# Patient Record
Sex: MALE | Race: OTHER | HISPANIC OR LATINO | Employment: FULL TIME | URBAN - METROPOLITAN AREA
[De-identification: names, ages, dates, MRNs, and addresses within clinical notes are randomized per-mention and may not be internally consistent; named-entity substitution may affect disease eponyms.]

---

## 2017-09-07 ENCOUNTER — HOSPITAL ENCOUNTER (EMERGENCY)
Facility: HOSPITAL | Age: 22
Discharge: HOME | End: 2017-09-07
Admitting: EMERGENCY MEDICINE
Payer: OTHER MISCELLANEOUS

## 2017-09-07 VITALS
HEIGHT: 68 IN | HEART RATE: 74 BPM | OXYGEN SATURATION: 100 % | TEMPERATURE: 97 F | SYSTOLIC BLOOD PRESSURE: 129 MMHG | DIASTOLIC BLOOD PRESSURE: 81 MMHG | RESPIRATION RATE: 16 BRPM | WEIGHT: 135 LBS | BODY MASS INDEX: 20.46 KG/M2

## 2017-09-07 DIAGNOSIS — M67.431 GANGLION CYST OF DORSUM OF RIGHT WRIST: Primary | ICD-10-CM

## 2017-09-07 PROCEDURE — 99283 EMERGENCY DEPT VISIT LOW MDM: CPT

## 2018-01-15 NOTE — PROGRESS NOTES
Patient Health Assessment    Date:            05/12/2016  Blood Pressure:  115/65  Pulse:           68  Age:             20  Weight:          139 lbs  Height/Length:   5' 7"  Body Mass Index: 21 7  Provider:        [id number]  Clinic:          [place] [id number]  Medical Alert:  Medications: Allergies:  Since Last Visit: Medical Alert: No Change    Medications: No Change    Allergies:        No Change  Pain Scale Type: Numeric Pain ScalePain Level: 0  Description:     Adult Prophy,fl varnish- no dentist today on Jessica Curet MH:  CC:none  IOE:light plaque, little to no calc  ,light bleeding upon flossing only-OHI &  supplies given  Scaled, polished and flossed  NV=after 10/2/16 for exam-he will be graduating     ----- Signed on Thursday, May 12, 2016 at 1:10:52 PM  -----  ----- Provider: [id number] - Jovani Sutherland,  -- Clinic: [facility] -----

## 2019-11-28 ENCOUNTER — HOSPITAL ENCOUNTER (EMERGENCY)
Facility: HOSPITAL | Age: 24
Discharge: HOME | End: 2019-11-28
Attending: EMERGENCY MEDICINE | Admitting: EMERGENCY MEDICINE
Payer: OTHER MISCELLANEOUS

## 2019-11-28 ENCOUNTER — APPOINTMENT (EMERGENCY)
Dept: RADIOLOGY | Facility: HOSPITAL | Age: 24
End: 2019-11-28
Payer: OTHER MISCELLANEOUS

## 2019-11-28 VITALS
RESPIRATION RATE: 18 BRPM | BODY MASS INDEX: 18.77 KG/M2 | OXYGEN SATURATION: 100 % | SYSTOLIC BLOOD PRESSURE: 147 MMHG | WEIGHT: 123.46 LBS | DIASTOLIC BLOOD PRESSURE: 80 MMHG | HEART RATE: 75 BPM

## 2019-11-28 DIAGNOSIS — M54.6 ACUTE RIGHT-SIDED THORACIC BACK PAIN: ICD-10-CM

## 2019-11-28 DIAGNOSIS — F43.10 PTSD (POST-TRAUMATIC STRESS DISORDER): ICD-10-CM

## 2019-11-28 DIAGNOSIS — S06.0X9A CONCUSSION: ICD-10-CM

## 2019-11-28 DIAGNOSIS — S09.90XA INJURY OF HEAD, INITIAL ENCOUNTER: ICD-10-CM
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
## 2019-11-28 PROCEDURE — 99284 EMERGENCY DEPT VISIT MOD MDM: CPT

## 2019-11-28 PROCEDURE — 90715 TDAP VACCINE 7 YRS/> IM: CPT | Performed by: EMERGENCY MEDICINE

## 2019-11-28 PROCEDURE — 99283 EMERGENCY DEPT VISIT LOW MDM: CPT | Performed by: EMERGENCY MEDICINE

## 2019-11-28 PROCEDURE — 90471 IMMUNIZATION ADMIN: CPT

## 2019-11-28 PROCEDURE — 71046 X-RAY EXAM CHEST 2 VIEWS: CPT

## 2019-11-28 RX ORDER — IBUPROFEN 600 MG/1
600 TABLET ORAL EVERY 6 HOURS PRN
Qty: 30 TABLET | Refills: 0 | Status: SHIPPED | OUTPATIENT
Start: 2019-11-28

## 2019-11-28 RX ORDER — IBUPROFEN 600 MG/1
600 TABLET ORAL ONCE
Status: COMPLETED | OUTPATIENT
Start: 2019-11-28 | End: 2019-11-28

## 2019-11-28 RX ADMIN — IBUPROFEN 600 MG: 600 TABLET, FILM COATED ORAL at 12:43

## 2019-11-28 RX ADMIN — TETANUS TOXOID, REDUCED DIPHTHERIA TOXOID AND ACELLULAR PERTUSSIS VACCINE, ADSORBED 0.5 ML: 5; 2.5; 8; 8; 2.5 SUSPENSION INTRAMUSCULAR at 12:43
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
## 2019-12-03 ENCOUNTER — OFFICE VISIT (OUTPATIENT)
Dept: INTERNAL MEDICINE CLINIC | Facility: CLINIC | Age: 24
End: 2019-12-03

## 2019-12-03 VITALS
BODY MASS INDEX: 17.96 KG/M2 | WEIGHT: 121.25 LBS | DIASTOLIC BLOOD PRESSURE: 90 MMHG | SYSTOLIC BLOOD PRESSURE: 104 MMHG | HEART RATE: 62 BPM | TEMPERATURE: 97.8 F | HEIGHT: 69 IN

## 2019-12-03 DIAGNOSIS — F43.23 SITUATIONAL MIXED ANXIETY AND DEPRESSIVE DISORDER: Primary | ICD-10-CM

## 2019-12-03 DIAGNOSIS — F43.10 PTSD (POST-TRAUMATIC STRESS DISORDER): ICD-10-CM
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
## 2019-12-03 PROCEDURE — 99203 OFFICE O/P NEW LOW 30 MIN: CPT | Performed by: PHYSICIAN ASSISTANT
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
## 2019-12-03 NOTE — LETTER
To Whom It May Concern:    Allie Miner is a patient at our practice and was evaluated by me on 12/3/19  Please excuse him for missing work and will be able to return thursday 12/5/19  Thanks you for your attention with this matter, and we will continue to follow wit him closely        Sincerely,              Ryann Hansen PA-C

## 2024-07-04 ENCOUNTER — HOSPITAL ENCOUNTER (EMERGENCY)
Facility: HOSPITAL | Age: 29
Discharge: HOME | End: 2024-07-04
Attending: EMERGENCY MEDICINE

## 2024-07-04 ENCOUNTER — APPOINTMENT (EMERGENCY)
Dept: RADIOLOGY | Facility: HOSPITAL | Age: 29
End: 2024-07-04

## 2024-07-04 VITALS
RESPIRATION RATE: 18 BRPM | TEMPERATURE: 97.6 F | OXYGEN SATURATION: 100 % | HEART RATE: 63 BPM | SYSTOLIC BLOOD PRESSURE: 130 MMHG | DIASTOLIC BLOOD PRESSURE: 88 MMHG

## 2024-07-04 DIAGNOSIS — M54.9 BACK PAIN: Primary | ICD-10-CM

## 2024-07-04 LAB
ATRIAL RATE: 61 BPM
P AXIS: 74 DEGREES
PR INTERVAL: 128 MS
QRS AXIS: 49 DEGREES
QRSD INTERVAL: 90 MS
QT INTERVAL: 372 MS
QTC INTERVAL: 374 MS
T WAVE AXIS: 58 DEGREES
VENTRICULAR RATE: 61 BPM

## 2024-07-04 PROCEDURE — 71046 X-RAY EXAM CHEST 2 VIEWS: CPT

## 2024-07-04 PROCEDURE — 93005 ELECTROCARDIOGRAM TRACING: CPT

## 2024-07-04 PROCEDURE — 96372 THER/PROPH/DIAG INJ SC/IM: CPT

## 2024-07-04 PROCEDURE — 99285 EMERGENCY DEPT VISIT HI MDM: CPT

## 2024-07-04 PROCEDURE — 99284 EMERGENCY DEPT VISIT MOD MDM: CPT | Performed by: EMERGENCY MEDICINE

## 2024-07-04 PROCEDURE — 93010 ELECTROCARDIOGRAM REPORT: CPT | Performed by: INTERNAL MEDICINE

## 2024-07-04 RX ORDER — KETOROLAC TROMETHAMINE 30 MG/ML
15 INJECTION, SOLUTION INTRAMUSCULAR; INTRAVENOUS ONCE
Status: COMPLETED | OUTPATIENT
Start: 2024-07-04 | End: 2024-07-04

## 2024-07-04 RX ORDER — ACETAMINOPHEN 325 MG/1
650 TABLET ORAL ONCE
Status: COMPLETED | OUTPATIENT
Start: 2024-07-04 | End: 2024-07-04

## 2024-07-04 RX ADMIN — ACETAMINOPHEN 650 MG: 325 TABLET, FILM COATED ORAL at 11:59

## 2024-07-04 RX ADMIN — KETOROLAC TROMETHAMINE 15 MG: 30 INJECTION, SOLUTION INTRAMUSCULAR at 11:59

## 2024-07-04 NOTE — ED PROVIDER NOTES
History  Chief Complaint   Patient presents with    Back Pain     Pt c/o back pain after lifting approximately 3 months ago, c/o worsening pain today that now radiates to chest      Patient is a 28-year-old male without significant past medical history presenting for back pain rating into his chest.  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  His pain has been generally improving over the last couple months but occasionally worsens for period of time.  He is coming in today specifically because now he feels like his back pain is radiating into his chest and that this has worried him.  Patient denies any other associated symptoms such as shortness of breath, nausea, paresthesias, or any other complaint on review of systems.        Prior to Admission Medications   Prescriptions Last Dose Informant Patient Reported? Taking?   ibuprofen (MOTRIN) 600 mg tablet   No No   Sig: Take 1 tablet (600 mg total) by mouth every 6 (six) hours as needed for mild pain      Facility-Administered Medications: None       Past Medical History:   Diagnosis Date    Anxiety     Depression        History reviewed. No pertinent surgical history.    History reviewed. No pertinent family history.  I have reviewed and agree with the history as documented.    E-Cigarette/Vaping     E-Cigarette/Vaping Substances     Social History     Tobacco Use    Smoking status: Never    Smokeless tobacco: Never   Substance Use Topics    Alcohol use: No    Drug use: No        Review of Systems   All other systems reviewed and are negative.      Physical Exam  ED Triage Vitals [07/04/24 1120]   Temperature Pulse Respirations Blood Pressure SpO2   97.6 °F (36.4 °C) 63 18 130/88 100 %      Temp Source Heart Rate Source Patient Position - Orthostatic VS BP Location FiO2 (%)   Tympanic Monitor  Sitting Left arm --      Pain Score       7             Orthostatic Vital Signs  Vitals:    07/04/24 1120   BP: 130/88   Pulse: 63   Patient Position - Orthostatic VS: Sitting       Physical Exam  Vitals and nursing note reviewed.   Constitutional:       General: He is not in acute distress.     Appearance: He is well-developed.   HENT:      Head: Normocephalic and atraumatic.   Eyes:      Conjunctiva/sclera: Conjunctivae normal.   Cardiovascular:      Rate and Rhythm: Normal rate and regular rhythm.      Heart sounds: No murmur heard.  Pulmonary:      Effort: Pulmonary effort is normal. No respiratory distress.      Breath sounds: Normal breath sounds.   Abdominal:      Palpations: Abdomen is soft.      Tenderness: There is no abdominal tenderness.   Musculoskeletal:         General: No swelling.      Cervical back: Neck supple.   Skin:     General: Skin is warm and dry.      Capillary Refill: Capillary refill takes less than 2 seconds.   Neurological:      Mental Status: He is alert.   Psychiatric:         Mood and Affect: Mood normal.         ED Medications  Medications   ketorolac (TORADOL) injection 15 mg (15 mg Intramuscular Given 7/4/24 1159)   acetaminophen (TYLENOL) tablet 650 mg (650 mg Oral Given 7/4/24 1159)       Diagnostic Studies  Results Reviewed       None                   XR chest 2 views   Final Result by Tere Ramos MD (07/04 1415)      No acute cardiopulmonary disease.               Workstation performed: [id number]               Procedures  Procedures      ED Course  ED Course as of 07/04/24 1708   Thu Jul 04, 2024   1135 Procedure Note: EKG  Date/Time: 07/04/24 11:35 AM   Interpreted by: Mauro Wong  Indications / Diagnosis: CP  ECG reviewed by me, the ED Provider: yes   The EKG demonstrates: normal EKG, normal sinus rhythm, there are no previous tracings available for comparison  Rhythm: normal sinus  Intervals: normal intervals  Axis: normal axis  QRS/Blocks: normal QRS  ST  Changes: No acute ST Changes, no STD/STEVEN.                       PERC Rule for PE      Flowsheet Row Most Recent Value   PERC Rule for PE    Age >=50 0 Filed at: 07/04/2024 1136   HR >=100 0 Filed at: 07/04/2024 1136   O2 Sat on room air < 95% 0 Filed at: 07/04/2024 1136   History of PE or DVT 0 Filed at: 07/04/2024 1136   Recent trauma or surgery 0 Filed at: 07/04/2024 1136   Hemoptysis 0 Filed at: 07/04/2024 1136   Exogenous estrogen 0 Filed at: 07/04/2024 1136   Unilateral leg swelling 0 Filed at: 07/04/2024 1136   PERC Rule for PE Results 0 Filed at: 07/04/2024 1136                    Wells' Criteria for PE      Flowsheet Row Most Recent Value   Wells' Criteria for PE    Clinical signs and symptoms of DVT 0 Filed at: 07/04/2024 1136   PE is primary diagnosis or equally likely 0 Filed at: 07/04/2024 1136   HR >100 0 Filed at: 07/04/2024 1136   Immobilization at least 3 days or Surgery in the previous 4 weeks 0 Filed at: 07/04/2024 1136   Previous, objectively diagnosed PE or DVT 0 Filed at: 07/04/2024 1136   Hemoptysis 0 Filed at: 07/04/2024 1136   Malignancy with treatment within 6 months or palliative 0 Filed at: 07/04/2024 1136   Wells' Criteria Total 0 Filed at: 07/04/2024 1136              Medical Decision Making  Patient presents for back pain rating into his chest.  Describes the pain is worse with range of motion of his bilateral arms but not movement or activity per se.  Given history of musculoskeletal back pain, and feeling of radiation, likely musculoskeletal costal muscle pain.  EKG unremarkable, vital signs reassuring.  Doubt ACS, pericarditis, patient percs out for PE.  Will obtain chest x-ray to rule out pneumothorax, fracture.  Symptomatic management.  Will discharge patient with return precautions and follow-up with comprehensive spine results unremarkable    Amount and/or Complexity of Data Reviewed  Radiology: ordered.    Risk  OTC drugs.  Prescription drug  management.          Disposition  Final diagnoses:   Back pain     Time reflects when diagnosis was documented in both MDM as applicable and the Disposition within this note       Time User Action Codes Description Comment    7/4/2024 12:36 PM Mauro Wong Add [M54.9] Back pain           ED Disposition       ED Disposition   Discharge    Condition   Stable    Date/Time   Thu Jul 4, 2024 1234    Comment   Alvarez Pedroza discharge to home/self care.                   Follow-up Information       Follow up With Specialties Details Why Contact Info Additional Information    [facility] Physical Therapy Call   [phone]            Discharge Medication List as of 7/4/2024 12:37 PM        CONTINUE these medications which have NOT CHANGED    Details   ibuprofen (MOTRIN) 600 mg tablet Take 1 tablet (600 mg total) by mouth every 6 (six) hours as needed for mild pain, Starting Thu 11/28/2019, Print               PDMP Review       None             ED Provider  Attending physically available and evaluated Alvarez Pedroza. I managed the patient along with the ED Attending.    Electronically Signed by           Mauro Wong MD  07/04/24 8636

## 2024-07-04 NOTE — DISCHARGE INSTRUCTIONS
Follow-up with [facility] for further treatment of your back pain    Follow-up with the resources provided to you for outpatient depression treatment

## 2024-07-04 NOTE — ED ATTENDING ATTESTATION
7/4/2024   IGail MD, saw and evaluated the patient. I have discussed the patient with the resident/non-physician practitioner and agree with the resident's/non-physician practitioner's findings, Plan of Care, and MDM as documented in the resident's/non-physician practitioner's note, except where noted. All available labs and Radiology studies were reviewed.  I was present for key portions of any procedure(s) performed by the resident/non-physician practitioner and I was immediately available to provide assistance.       At this point I agree with the current assessment done in the Emergency Department.  I have conducted an independent evaluation of this patient a history and physical is as follows:    Unit/Bed#: [facility] Encounter: [id number]    Chief Complaint   Patient presents with    Back Pain     Pt c/o back pain after lifting approximately 3 months ago, c/o worsening pain today that now radiates to chest      28-year-old male with no significant past medical history presenting with back pain.  Patient reports pulling his back when he was using a rowing machine in the gym about 3 months ago.  Since then, he intermittently has worsening pain.  Since the injury, he is switched jobs and is no longer lifting heavy objects, however, he notes that his upper back is still intermittently flaring up and causing significant pain.  Pain is worsened with movement.  Patient notes being under significant amount of stress due to his job as well as due to having a new baby who is only several months old.  He reports occasionally feeling down and irritable and having episodes of anxiety and panic.  One of his friends who has similar issues encouraged him to seek help.  Patient has not had any SI or HI but he would be interested in following up with a specialist.    Physical Exam  ED Triage Vitals [07/04/24 1120]   Temperature Pulse Respirations Blood Pressure SpO2   97.6 °F (36.4 °C) 63 18 130/88 100 %      Temp Source  Heart Rate Source Patient Position - Orthostatic VS BP Location FiO2 (%)   Tympanic Monitor Sitting Left arm --      Pain Score       7           Vital signs and nursing notes reviewed    CONSTITUTIONAL: male appearing stated age resting in bed, in no acute distress  HEENT: atraumatic, normocephalic. Sclera anicteric, conjunctiva are not injected. Moist oral mucosa  CARDIOVASCULAR/CHEST: RRR, no M/R/G. 2+ radial pulses  PULMONARY: Breathing comfortably on RA. Breath sounds are equal and clear to auscultation  ABDOMEN: non-distended. BS present, normoactive. Non-tender  MSK: No midline C, T, or L-spine tenderness.  There is significant tenderness with palpation over bilateral cervical paraspinal muscle group and all the way down to patient's rhomboids.  There is hypertonicity of the cervical and upper thoracic musculature bilaterally.  Patient moves all extremities, no deformities, no peripheral edema, no calf asymmetry  NEURO: Awake, alert, and oriented x 3. Face symmetric. Moves all extremities spontaneously. No focal neurologic deficits  SKIN: Warm, appears well-perfused  MENTAL STATUS: Normal affect      Labs and Imaging  Labs Reviewed - No data to display    XR chest 2 views   Final Result      No acute cardiopulmonary disease.               Workstation performed: [id number]               Procedures  Procedures        ED Course  Medications   ketorolac (TORADOL) injection 15 mg (15 mg Intramuscular Given 7/4/24 1159)   acetaminophen (TYLENOL) tablet 650 mg (650 mg Oral Given 7/4/24 1159)     28-year-old male presenting with neck and upper back muscle pain as well as stress versus adjustment disorder versus depression/anxiety.  Patient treated symptomatically for his muscle strain/spasms.  We did obtain a chest x-ray since some of patient symptoms were worsened with taking a deep breath in.  Chest x-ray to my interpretation is without infiltrates to suggest pneumonia and without evidence of pneumothorax.  Will  plan for patient to follow-up with mental health specialist.  Will also have him follow-up with comprehensive spine program.  Patient discharged to home with recommendations for symptom control, return precautions, and plan for follow up.

## 2024-07-05 ENCOUNTER — TELEPHONE (OUTPATIENT)
Dept: PHYSICAL THERAPY | Facility: OTHER | Age: 29
End: 2024-07-05

## 2024-07-05 NOTE — TELEPHONE ENCOUNTER
Call placed to the patient per [facility] referral.    V/m left in Indonesian for patient to call back. Phone number and hours of business provided.    This is the 1st attempt to reach the patient. Will defer referral per protocol.    H . I ??

## 2024-07-29 ENCOUNTER — TELEPHONE (OUTPATIENT)
Dept: PHYSICAL THERAPY | Facility: OTHER | Age: 29
End: 2024-07-29

## 2024-07-29 NOTE — TELEPHONE ENCOUNTER
Patient called into [facility] and left v/m on Saturday @2:11pm.    Returned patient's call @8:29am.    V/M left in [place]ian for patient to call back. Phone number and hours of business provided.    [facility] referral closed per protocol. Called x 3.